# Patient Record
Sex: FEMALE | URBAN - METROPOLITAN AREA
[De-identification: names, ages, dates, MRNs, and addresses within clinical notes are randomized per-mention and may not be internally consistent; named-entity substitution may affect disease eponyms.]

---

## 2018-12-17 ENCOUNTER — APPOINTMENT (RX ONLY)
Dept: URBAN - METROPOLITAN AREA CLINIC 23 | Facility: CLINIC | Age: 58
Setting detail: DERMATOLOGY
End: 2018-12-17

## 2018-12-17 DIAGNOSIS — Z41.9 ENCOUNTER FOR PROCEDURE FOR PURPOSES OTHER THAN REMEDYING HEALTH STATE, UNSPECIFIED: ICD-10-CM

## 2018-12-17 PROBLEM — E78.5 HYPERLIPIDEMIA, UNSPECIFIED: Status: ACTIVE | Noted: 2018-12-17

## 2018-12-17 PROCEDURE — ? FILLERS

## 2018-12-17 PROCEDURE — ? DYSPORT

## 2018-12-17 NOTE — PROCEDURE: DYSPORT
Nasal Root Units: 0
Consent: Written consent obtained. Risks include but not limited to lid/brow ptosis, bruising, swelling, diplopia, temporary effect, incomplete chemical denervation.
Lot #: [id number]
Dilution (U/ 0.1cc): 1.5
Periorbital Skin Units: [address]
Additional Area 2 Location: high forehead
Additional Area 4 Location: Crows feet
Additional Area 6 Location: left brow and glabella
Additional Area 1 Location: glabella
Detail Level: Detailed
Expiration Date (Month Year): 07/31/2019
Glabellar Complex Units: 30
Price (Use Numbers Only, No Special Characters Or $): 0.00

## 2018-12-17 NOTE — PROCEDURE: FILLERS
Post-Care Instructions: Patient instructed to apply ice to reduce swelling.
Additional Area 2 Location: upper cheeks, marionette lines, oral commissures
Mid Face Filler  Volume In Cc: 0
Additional Area 1 Location: Vermillion fine lines, lips, Nasalabial folds
Expiration Date (Month Year): 08/31/2019
Include Cannula Length?: 1.5 inch
Additional Anesthesia Volume In Cc: 6
Include Cannula Information In Note?: No
Additional Area 1 Location: using the cannula to bilateral cheeks.  Using conventional needle to
Include Cannula Size?: 25G
Expiration Date (Month Year): 04/30/2021
Additional Area 1 Location: lateral mouth, fine lines perioral, marionette lines, lateral cheeks, vermillion lips
Include Cannula Brand?: DermaSculpt
Lot #: [id number]
Detail Level: Zone
Additional Area 2 Location: Nasalabial, Glabellar fine lines- Complimentary
Filler: Restylane
Expiration Date (Month Year): 05/31/2021
Price (Use Numbers Only, No Special Characters Or $): 0.00
Lot #: [id number]
Additional Area 5 Location: Cheeks with Cannula, vermillion lips, lateral mouth
Map Statment: See [address] for Complete Details
Anesthesia Type: 1% lidocaine with epinephrine
Additional Area 4 Location: lateral Jawline, lateral cheeks
Additional Area 3 Location: Glabbellar fine lines, Nasalabial folds, Marionette lines, vermillion lip
Additional Area 3 Location: Medial cheeks injected Using a cannula
Anesthesia Volume In Cc: 0.2
Lot #: [id number]
Additional Area 2 Location: Oral commissures, and perioral area injected using a cannula
Additional Area 2 Volume In Cc: 1
Consent: Written consent obtained. Risks include but not limited to bruising, beading, irregular texture, ulceration, infection, allergic reaction, scar formation, incomplete augmentation, temporary nature, procedural pain.

## 2019-04-02 ENCOUNTER — APPOINTMENT (RX ONLY)
Dept: URBAN - METROPOLITAN AREA CLINIC 23 | Facility: CLINIC | Age: 59
Setting detail: DERMATOLOGY
End: 2019-04-02

## 2019-04-02 DIAGNOSIS — Z41.9 ENCOUNTER FOR PROCEDURE FOR PURPOSES OTHER THAN REMEDYING HEALTH STATE, UNSPECIFIED: ICD-10-CM

## 2019-04-02 PROCEDURE — ? FILLERS

## 2019-04-02 PROCEDURE — ? BOTOX

## 2019-04-02 PROCEDURE — ? DIAGNOSIS COMMENT

## 2019-04-02 NOTE — PROCEDURE: FILLERS
Decollete Filler  Volume In Cc: 0
Include Cannula Length?: 1.5 inch
Additional Area 2 Location: oral commissures,lat cheeks,lat jawline
Additional Area 1 Location: Perioral fine lines, lips
Additional Area 1 Volume In Cc: 0.5
Additional Area 1 Location: lateral mouth, fine lines perioral, marionette lines, lateral cheeks, vermillion lips
Additional Area 2 Location: vermillion fine lines, nasalabial folds,oral commissure
Additional Area 3 Location: Lateral cheeks, oral commissure , marionettes and jaw line
Filler: Juvederm Ultra XC
Additional Area 2 Location: Nasalabial, Glabellar fine lines- Complimentary
Additional Area 3 Location: Glabbellar fine lines, Nasalabial folds, Marionette lines, vermillion lip
Additional Area 4 Location: lateral jawline, lateral cheeks, lateral mouth, marionette lines.
Filler: Juvederm Volbella XC
Additional Area 4 Location: Lateral Cheeks
Consent: Written consent obtained. Risks include but not limited to bruising, beading, irregular texture, ulceration, infection, allergic reaction, scar formation, incomplete augmentation, temporary nature, procedural pain.
Detail Level: Zone
Additional Area 5 Location: medial  Cheeks using cannula
Post-Care Instructions: Patient instructed to apply ice to reduce swelling.
Lot #: [id number]
Anesthesia Type: 1% lidocaine without epinephrine
Price (Use Numbers Only, No Special Characters Or $): 0.00
Lot #: [id number]
Expiration Date (Month Year): 02/18/20
Include Cannula Information In Note?: No
Expiration Date (Month Year): 6/13/20
Lot #: [id number]
Expiration Date (Month Year): 08/31/2019
Additional Anesthesia Volume In Cc: 6
Include Cannula Brand?: DermaSculpt
Include Cannula Size?: 25G
Additional Area 1 Location: Lateral jawline, lateral mouth,marionette lines, lateral cheeks
Map Statment: See [address] for Complete Details
Additional Area 1 Volume In Cc: 1

## 2019-04-02 NOTE — PROCEDURE: BOTOX
Glabellar Complex Units: 24
Additional Area 5 Units: 0
Additional Area 4 Location: chin
Additional Area 3 Location: high forehead 2 cm above brows
Expiration Date (Month Year): 08/2021
Lot #: [id number]
Additional Area 2 Location: lateral eyelids
Additional Area 6 Location: [facility].
Price (Use Numbers Only, No Special Characters Or $): 0.00
Dilution (U/0.1 Cc): 2.5
Consent: Written consent obtained. Risks include but not limited to lid/brow ptosis, bruising, swelling, diplopia, temporary effect, incomplete chemical denervation.
Additional Area 1 Location: high forehead
Detail Level: Zone
Additional Area 5 Location: high forehead 1.5cm above brows
Expiration Date (Month Year): 12/20
Lot #: [id number]

## 2019-07-22 ENCOUNTER — RX ONLY (OUTPATIENT)
Age: 59
Setting detail: RX ONLY
End: 2019-07-22

## 2019-07-22 ENCOUNTER — APPOINTMENT (RX ONLY)
Dept: URBAN - METROPOLITAN AREA CLINIC 23 | Facility: CLINIC | Age: 59
Setting detail: DERMATOLOGY
End: 2019-07-22

## 2019-07-22 DIAGNOSIS — Z41.9 ENCOUNTER FOR PROCEDURE FOR PURPOSES OTHER THAN REMEDYING HEALTH STATE, UNSPECIFIED: ICD-10-CM

## 2019-07-22 PROCEDURE — ? ADDITIONAL NOTES

## 2019-07-22 PROCEDURE — ? PROFOUND

## 2019-07-22 RX ORDER — CEPHALEXIN 500 MG/1
BID CAPSULE ORAL
Qty: 20 | Refills: 0 | Status: ERX | COMMUNITY
Start: 2019-07-22

## 2019-07-22 RX ORDER — VALACYCLOVIR HYDROCHLORIDE 500 MG/1
BID TABLET, FILM COATED ORAL
Qty: 20 | Refills: 0 | Status: ERX | COMMUNITY
Start: 2019-07-22

## 2019-07-22 NOTE — PROCEDURE: PROFOUND
Device Serial Number (Optional): [id number] Exp: 1/29/22
Pre-Procedure Text: After consent was obtained the treatment areas were cleaned and treated using the parameters mentioned above.
Treatment Number: 1
Spacinmm
Temperature (Optional): 66[address] [facility]
Temperature (Optional): 67
Topical Anesthesia?: 20% benzocaine, 8% lidocaine, and 8% tetracaine
Detail Level: Simple
Length Topical Anesthesia Applied (Optional): 20 minutes
Consent: Written consent obtained, risks reviewed including but not limited to crusting, scabbing, blistering, scarring, darker or lighter pigmentary change, and/or incomplete improvement.
Time Duration (Optional): 3.0
Pre-Treatment Photos?: Yes
Post-Procedure Text: Following the procedure, post care was reviewed with the patient.
Time Duration (Optional): 4.0
Anesthesia Volume In Cc: 5.7
Start Time (Optional): 10: 55 AM
Post-Care Instructions: I reviewed with the patient in detail post-care instructions. Patient should stay away from the sun and wear sun protection until fully healed.
Local Anesthesia: 1% lidocaine with 1:100,000 epinephrine and 0.9% sodium chloride (tumescent anesthesia)
External Cooling Fan Speed: 5
Total Insertions (Required): 100

## 2019-07-22 NOTE — PROCEDURE: ADDITIONAL NOTES
Additional Notes: Written consent obtained, risks reviewed including but not limited to crusting, scabbing, blistering, scarring, darker\\nor lighter pigmentary change, incomplete improvement of dyschromia, wrinkles, prolonged erythema and facial\\nswelling, infection and bleeding. \\n\\nPhotos taken: Yes/No\\nAnesthesia: topical Lidocaine/Prilocaine & local infiltration\\nAmount of anesthesia: \\nType of eye protection: \\n\\nCO2RE Laser with the following settings:\\n\\nSite: \\nMode: \\nFractionated coverage:\\nRing:\\nRing Size:\\nRing mj:\\nRing Fluence:\\nCore mg: \\nCore Fluence:\\n\\nSite:\\nMode: \\nFractionated coverage:\\nRing:\\nRing Size:\\nRing mj:\\nRing Fluence:\\nCore mg: \\nCore Fluence:\\n\\nSite:\\nMode: \\nFractionated coverage:\\nRing:\\nRing Size:\\nRing mj:\\nRing Fluence:\\nCore mg: \\nCore Fluence:\\n\\n\\n\\nI reviewed with the patient in detail post-care instructions. Patient should avoid sun until area fully healed. Pt should apply vaseline to treated areas, and remove crusts gently with water-vinegar soaks.

## 2019-07-23 ENCOUNTER — APPOINTMENT (RX ONLY)
Dept: URBAN - METROPOLITAN AREA CLINIC 23 | Facility: CLINIC | Age: 59
Setting detail: DERMATOLOGY
End: 2019-07-23

## 2019-07-23 DIAGNOSIS — Z41.9 ENCOUNTER FOR PROCEDURE FOR PURPOSES OTHER THAN REMEDYING HEALTH STATE, UNSPECIFIED: ICD-10-CM

## 2019-07-23 PROCEDURE — ? TREATMENT REGIMEN

## 2019-07-23 PROCEDURE — ? IN-HOUSE DISPENSING PHARMACY

## 2019-07-23 PROCEDURE — ? PATIENT SPECIFIC COUNSELING

## 2019-07-23 PROCEDURE — ? PULSED-DYE LASER

## 2019-07-23 NOTE — PROCEDURE: PULSED-DYE LASER
Location Override: lower face
Cryogen Time (Ms): [address]
Delay Time (Ms): [address] [facility]
Price (Use Numbers Only, No Special Characters Or $): 0.00
Detail Level: Zone
Spot Size: 10x3 mm
Delay Time (Ms): 10
Cryogen Time (Ms): 30
Spot Size: 10 mm
Post-Procedure Care: Sunscreen applied. Post care reviewed with patient.
Fluence In J/Cm2 (Optional): 5. 75
Treated Area: medium area
Comments: Post-cosmetic laser.
Pulse Duration: 10 ms
Treated Area: small area
Fluence In J/Cm2 (Optional): 10.0
Spot Size: 7 mm
Post-Care Instructions: I reviewed with the patient in detail post-care instructions. Patient should stay away from the sun and wear sun protection until treated areas are fully healed.
Pulse Duration: 6 ms
Laser Type: Vbeam 595nm
Delay Time (Ms): 20
Fluence In J/Cm2 (Optional): 7.00
Consent: Written consent obtained, risks reviewed including but not limited to crusting, scabbing, blistering, scarring, darker or lighter pigmentary change, incidental hair removal, bruising, and/or incomplete removal.
Pulse Duration: 20 ms

## 2019-07-23 NOTE — HPI: COSMETIC FOLLOW UP
How Did You Tolerate The Procedure?: well, without problems
What Condition Are We Treating?: patient here s/p 24 hours post profound lower face and Core ( fractionated ablative) perioral area.
What Procedure Did We Perform At The Last Visit?: Profound and fractionated laser.

## 2019-07-23 NOTE — PROCEDURE: IN-HOUSE DISPENSING PHARMACY
Product 16 Unit Type: mg
Product 56 Price/Unit (In Dollars): 0
Name Of Product 19: Diazepam 5mg
Product 1 Price/Unit (In Dollars): 0.00
Product 3 Application Directions: Apply a pea size amount to the entire face at night
Product 10 Application Directions: Take 1 tablet 2 times daily for 3 days
Product 14 Amount/Unit (Numbers Only): 1
Product 3 Unit Type: grams
Product 12 Application Directions: Apply a pea size amount to the entire face at bedtime.
Product 10 Unit Type: bottle(s)
Name Of Product 4: Valium 5 mg
Name Of Product 11: Clearing gel
Name Of Product 13: Skin Better Even tone correcting serum
Product 22 Application Directions: Apply to the affected area of the face
Name Of Product 7: Latisse 5ml
Product 2 Application Directions: apply to affected areas left lower leg am and pm  as indicated
Product 9 Application Directions: Apply to the acne prone areas in the Am and Pm
Product 2 Unit Type: tablets
Product 9 Unit Type: jar(s)
Name Of Product 3: Tretinoin 0.05% cream
Product 7 Amount/Unit (Numbers Only): 5
Name Of Product 10: Valtrex 500 mg
Name Of Product 1: Hydroquinone 4% / Tretinoin 0.05% / Fluocinolone 0.01%
Name Of Product 8: TNS Essential
Product 14 Application Directions: Apply to the under eyes in the Am and pm
Product 21 Application Directions: Take one tablet 30 minutes prior to procedure
Product 3 Amount/Unit (Numbers Only): [address] [facility]
Name Of Product 6: Prednisone 20mg
Product 14 Unit Type: tube(s)
Product 1 Application Directions: Apply thin layer to face at bedtime only as indicated.
Product 8 Application Directions: Apply to the clean face in the AM and PM daily
Name Of Product 22: Brightening Pads
Product 6 Application Directions: Take one tablet for swelling today at the office as indicated and one tablet tomorrow
Name Of Product 2: Valtrex
Name Of Product 9: Clearing tone pads
Product 4 Application Directions: Take one tablet half hour today prior to procedure as indicated.  Dispense in office
Product 11 Application Directions: Apply a thin layer to the acne prone areas in the AM
Detail Level: Zone
Product 13 Application Directions: Apply to the entire face in the Am and pm
Product 2 Amount/Unit (Numbers Only): 6
Send Charges To Patient Encounter: No
Name Of Product 5: loratadine 10 mg
Name Of Product 12: Retinol Lite
Product 7 Application Directions: Apply to lashes at bedtime daily as indicated
Name Of Product 14: Skin Better Interfuse eye treatment cream
Name Of Product 21: Valium 5mg
Product 7 Unit Type: ml
Product 5 Application Directions: Take one tablet today after procedure with full glass of water as indicated